# Patient Record
Sex: FEMALE | Race: BLACK OR AFRICAN AMERICAN | URBAN - METROPOLITAN AREA
[De-identification: names, ages, dates, MRNs, and addresses within clinical notes are randomized per-mention and may not be internally consistent; named-entity substitution may affect disease eponyms.]

---

## 2021-04-27 ENCOUNTER — WEB ENCOUNTER (OUTPATIENT)
Dept: URBAN - METROPOLITAN AREA CLINIC 94 | Facility: CLINIC | Age: 36
End: 2021-04-27

## 2021-04-27 ENCOUNTER — DASHBOARD ENCOUNTERS (OUTPATIENT)
Age: 36
End: 2021-04-27

## 2021-04-27 ENCOUNTER — OFFICE VISIT (OUTPATIENT)
Dept: URBAN - METROPOLITAN AREA CLINIC 94 | Facility: CLINIC | Age: 36
End: 2021-04-27
Payer: COMMERCIAL

## 2021-04-27 VITALS
TEMPERATURE: 97.7 F | SYSTOLIC BLOOD PRESSURE: 157 MMHG | HEART RATE: 85 BPM | BODY MASS INDEX: 42.34 KG/M2 | WEIGHT: 248 LBS | HEIGHT: 64 IN | DIASTOLIC BLOOD PRESSURE: 107 MMHG

## 2021-04-27 DIAGNOSIS — K62.5 RECTAL BLEEDING: ICD-10-CM

## 2021-04-27 DIAGNOSIS — R10.84 GENERALIZED ABDOMINAL PAIN: ICD-10-CM

## 2021-04-27 DIAGNOSIS — R19.7 DIARRHEA OF PRESUMED INFECTIOUS ORIGIN: ICD-10-CM

## 2021-04-27 PROCEDURE — 99204 OFFICE O/P NEW MOD 45 MIN: CPT | Performed by: INTERNAL MEDICINE

## 2021-04-27 RX ORDER — OXYCODONE HYDROCHLORIDE AND ACETAMINOPHEN 5; 325 MG/1; MG/1
1 TABLET AS NEEDED TABLET ORAL
Status: ACTIVE | COMMUNITY

## 2021-04-27 RX ORDER — IBUPROFEN 600 MG/1
1 TABLET WITH FOOD OR MILK AS NEEDED TABLET ORAL THREE TIMES A DAY
Status: ACTIVE | COMMUNITY

## 2021-04-27 RX ORDER — INSULIN GLARGINE 100 [IU]/ML
AS DIRECTED INJECTION, SOLUTION SUBCUTANEOUS
Status: ACTIVE | COMMUNITY

## 2021-04-27 RX ORDER — NIFEDIPINE 60 MG/1
1 TABLET ON AN EMPTY STOMACH TABLET, EXTENDED RELEASE ORAL ONCE A DAY
Status: ACTIVE | COMMUNITY

## 2021-04-27 NOTE — HPI-TODAY'S VISIT:
35 y/o F here for acute colitis after recent ER visits  Patient reports was doing well until 2 weeks ago when had sudden onset cramping abdominal pain, bloody diarrhea and fatigue. Went to ER twice in this period([facility]), where per patient had a CT a/p(no report available but discharge paper mentions colitis as diagnosis) and given abx, dicyclomine. Hgb on 04/21 was 10 with MCV 86.3. Did eat food outside at a seafood restaurant and her two children also got sick, but theirs only lasted for 3 days  Today, reports her abdominal pain has improved and had a solid BM today without bleeding. Never took the dicyclomine other than for 1 day. Has 4-5 days of abx left. Is concerned and would like further workup  Denies any family hx of GI malignancy, IBD

## 2021-05-19 ENCOUNTER — OFFICE VISIT (OUTPATIENT)
Dept: URBAN - METROPOLITAN AREA SURGERY CENTER 17 | Facility: SURGERY CENTER | Age: 36
End: 2021-05-19

## 2021-05-21 ENCOUNTER — OFFICE VISIT (OUTPATIENT)
Dept: URBAN - METROPOLITAN AREA SURGERY CENTER 17 | Facility: SURGERY CENTER | Age: 36
End: 2021-05-21

## 2021-06-03 ENCOUNTER — OFFICE VISIT (OUTPATIENT)
Dept: URBAN - METROPOLITAN AREA SURGERY CENTER 17 | Facility: SURGERY CENTER | Age: 36
End: 2021-06-03

## 2021-06-04 ENCOUNTER — OFFICE VISIT (OUTPATIENT)
Dept: URBAN - METROPOLITAN AREA CLINIC 94 | Facility: CLINIC | Age: 36
End: 2021-06-04